# Patient Record
Sex: MALE | Race: WHITE | NOT HISPANIC OR LATINO
[De-identification: names, ages, dates, MRNs, and addresses within clinical notes are randomized per-mention and may not be internally consistent; named-entity substitution may affect disease eponyms.]

---

## 2023-05-16 ENCOUNTER — TRANSCRIPTION ENCOUNTER (OUTPATIENT)
Age: 68
End: 2023-05-16

## 2023-05-22 ENCOUNTER — TRANSCRIPTION ENCOUNTER (OUTPATIENT)
Age: 68
End: 2023-05-22

## 2023-05-22 PROBLEM — Z00.00 ENCOUNTER FOR PREVENTIVE HEALTH EXAMINATION: Status: ACTIVE | Noted: 2023-05-22

## 2023-05-24 ENCOUNTER — APPOINTMENT (OUTPATIENT)
Dept: UROLOGY | Facility: CLINIC | Age: 68
End: 2023-05-24
Payer: COMMERCIAL

## 2023-05-24 VITALS
DIASTOLIC BLOOD PRESSURE: 82 MMHG | HEIGHT: 69 IN | SYSTOLIC BLOOD PRESSURE: 124 MMHG | OXYGEN SATURATION: 95 % | WEIGHT: 210 LBS | BODY MASS INDEX: 31.1 KG/M2 | TEMPERATURE: 97.7 F | HEART RATE: 95 BPM

## 2023-05-24 PROCEDURE — 99205 OFFICE O/P NEW HI 60 MIN: CPT

## 2023-05-24 NOTE — HISTORY OF PRESENT ILLNESS
[FreeTextEntry1] : 67 year old man with history of BPH, recent hospital admission for urinary retention, urosepsis and MAGGIE.\par \par He initially presented to [facility] with urinary retention on 5/3/23. He had gilmore catheter placed at that time and was started on tamsulosin. He had subsequent unsuccessful void trial in the office. He then presented to Urologist with fevers and tachycardia, sent to ED, admitted to the hospital and started on IV antibiotics. He was found to have klebsiella bacteremia. Repeat blood cultures were negative. Eventually discharged with 14 days of levaquin. \par \par Prior to this episode, he did have urinary symptoms, though not bothersome enough to take medications. \par \par CT A/P: mild bilateral hydronephrosis, 200 cc prostate\par \par PSA 6/2022--5.4. No prior prostate biopsies or prostate MRI. No known family history of prostate cancer.

## 2023-05-24 NOTE — PHYSICAL EXAM
[General Appearance - Well Developed] : well developed [General Appearance - Well Nourished] : well nourished [Normal Appearance] : normal appearance [Well Groomed] : well groomed [General Appearance - In No Acute Distress] : no acute distress [Edema] : no peripheral edema [Respiration, Rhythm And Depth] : normal respiratory rhythm and effort [Exaggerated Use Of Accessory Muscles For Inspiration] : no accessory muscle use [Abdomen Soft] : soft [Abdomen Tenderness] : non-tender [Costovertebral Angle Tenderness] : no ~M costovertebral angle tenderness [Urethral Meatus] : meatus normal [Urinary Bladder Findings] : the bladder was normal on palpation [Scrotum] : the scrotum was normal [Testes Mass (___cm)] : there were no testicular masses [No Prostate Nodules] : no prostate nodules [Prostate Size ___ gm] : prostate size [unfilled] gm [Normal Station and Gait] : the gait and station were normal for the patient's age [] : no rash [No Focal Deficits] : no focal deficits [Oriented To Time, Place, And Person] : oriented to person, place, and time [Affect] : the affect was normal [Mood] : the mood was normal [Not Anxious] : not anxious [FreeTextEntry1] : Benito catheter in place with clear urine

## 2023-05-24 NOTE — ASSESSMENT
[FreeTextEntry1] : Assessment: Mr. BOO RHODES  is a 67 year year old man with 200 cc prostate, elevated PSA to 5.4 with no prior MRI or biopsy, recent onset of urinary retention and episode of urosepsis with klebsiella bacteremia. Given several failed void trials, recommend indwelling gilmore catheter or CIC until prostate debulking procedure can be performed.\par \par We discussed different therapeutic options including TURP, PVP, simple prostatectomy (open, laparoscopic or transurethral laser enucleation), Aquablation, Urolift therapy, and Rezum in addition to full continuation of medical therapy. Discussed the issues of incontinence, retrograde ejaculation, erectile dysfunction, irritative voiding symptoms, injury to urethra and bladder, persistence of irritative voiding symptoms, urethral stricture or bladder neck contracture, need for open surgery to repair the injury, erectile dysfunction and infertility.\par \par I made it clear that because of his large prostate size > 80 grams, AUA recommendation is to perform a simple prostatectomy due to the ability to obtain effective and durable improvement in voiding symptoms. A simple prostatectomy can be performed via an open approach, laparoscopic approach or transurethral laser enucleation approach. We discussed the risks and benefits of each approach. Overall, the long term outcomes are similar. However, the laser enucleation procedure has the least morbidity with quickest recovery of the three options. Therefore, I have recommended laser enucleation of the prostate performed via a transurethral approach.\par \par Mr. RHODES decided to proceed with laser enucleation of prostate followed by prostate morcellation. Therefore, I spent a good amount of time discussing the risks and benefits of this procedure. We discussed potential risks of incontinence, retrograde ejaculation and infertility, erectile dysfunction, irritative voiding symptoms, injury to the urethra and bladder, rare need to convert to an open surgery.\par \par  - OR for HoLEP\par  - Pre-op clearance\par  - Plan for pre-op and post-op antibiotics with levaquin\par  - Prostate MRI given elevated PSA without prior evaluation

## 2023-05-24 NOTE — LETTER BODY
[Dear  ___] : Dear  [unfilled], [Courtesy Letter:] : I had the pleasure of seeing your patient, [unfilled], in my office today. [Please see my note below.] : Please see my note below. [Consult Closing:] : Thank you very much for allowing me to participate in the care of this patient.  If you have any questions, please do not hesitate to contact me. [Sincerely,] : Sincerely, [FreeTextEntry3] : Eren Greenwood MD

## 2023-05-25 LAB
ALBUMIN SERPL ELPH-MCNC: 4.4 G/DL
ALP BLD-CCNC: 116 U/L
ALT SERPL-CCNC: 19 U/L
ANION GAP SERPL CALC-SCNC: 16 MMOL/L
APTT BLD: 30.8 SEC
AST SERPL-CCNC: 17 U/L
BILIRUB SERPL-MCNC: 0.5 MG/DL
BUN SERPL-MCNC: 18 MG/DL
CALCIUM SERPL-MCNC: 9.9 MG/DL
CHLORIDE SERPL-SCNC: 105 MMOL/L
CO2 SERPL-SCNC: 20 MMOL/L
CREAT SERPL-MCNC: 1.72 MG/DL
EGFR: 44 ML/MIN/1.73M2
GLUCOSE SERPL-MCNC: 96 MG/DL
INR PPP: 1.17 RATIO
POTASSIUM SERPL-SCNC: 4 MMOL/L
PROT SERPL-MCNC: 7.2 G/DL
PT BLD: 13.6 SEC
SODIUM SERPL-SCNC: 141 MMOL/L

## 2023-05-26 ENCOUNTER — NON-APPOINTMENT (OUTPATIENT)
Age: 68
End: 2023-05-26

## 2023-05-26 DIAGNOSIS — N32.89 OTHER SPECIFIED DISORDERS OF BLADDER: ICD-10-CM

## 2023-05-26 LAB — BACTERIA UR CULT: NORMAL

## 2023-06-02 ENCOUNTER — NON-APPOINTMENT (OUTPATIENT)
Age: 68
End: 2023-06-02

## 2023-06-02 ENCOUNTER — APPOINTMENT (OUTPATIENT)
Dept: INTERNAL MEDICINE | Facility: CLINIC | Age: 68
End: 2023-06-02
Payer: COMMERCIAL

## 2023-06-02 VITALS
BODY MASS INDEX: 30.07 KG/M2 | WEIGHT: 203 LBS | SYSTOLIC BLOOD PRESSURE: 117 MMHG | HEIGHT: 69 IN | TEMPERATURE: 97 F | DIASTOLIC BLOOD PRESSURE: 83 MMHG | OXYGEN SATURATION: 96 % | HEART RATE: 87 BPM

## 2023-06-02 DIAGNOSIS — N18.30 CHRONIC KIDNEY DISEASE, STAGE 3 UNSPECIFIED: ICD-10-CM

## 2023-06-02 DIAGNOSIS — Z78.9 OTHER SPECIFIED HEALTH STATUS: ICD-10-CM

## 2023-06-02 DIAGNOSIS — Z01.818 ENCOUNTER FOR OTHER PREPROCEDURAL EXAMINATION: ICD-10-CM

## 2023-06-02 DIAGNOSIS — Z80.9 FAMILY HISTORY OF MALIGNANT NEOPLASM, UNSPECIFIED: ICD-10-CM

## 2023-06-02 DIAGNOSIS — U07.1 COVID-19: ICD-10-CM

## 2023-06-02 DIAGNOSIS — Z87.01 PERSONAL HISTORY OF PNEUMONIA (RECURRENT): ICD-10-CM

## 2023-06-02 DIAGNOSIS — Z80.0 FAMILY HISTORY OF MALIGNANT NEOPLASM OF DIGESTIVE ORGANS: ICD-10-CM

## 2023-06-02 PROCEDURE — 36415 COLL VENOUS BLD VENIPUNCTURE: CPT

## 2023-06-02 PROCEDURE — 99203 OFFICE O/P NEW LOW 30 MIN: CPT | Mod: 25

## 2023-06-02 RX ORDER — OXYBUTYNIN CHLORIDE 5 MG/1
5 TABLET ORAL
Qty: 10 | Refills: 0 | Status: DISCONTINUED | COMMUNITY
Start: 2023-05-26 | End: 2023-06-02

## 2023-06-02 RX ORDER — TAMSULOSIN HCL 0.4 MG
0.4 CAPSULE ORAL
Refills: 0 | Status: DISCONTINUED | COMMUNITY
End: 2023-06-02

## 2023-06-02 RX ORDER — LEVOFLOXACIN 750 MG/1
750 TABLET, FILM COATED ORAL
Refills: 0 | Status: DISCONTINUED | COMMUNITY
End: 2023-06-02

## 2023-06-02 NOTE — HISTORY OF PRESENT ILLNESS
[No Pertinent Cardiac History] : no history of aortic stenosis, atrial fibrillation, coronary artery disease, recent myocardial infarction, or implantable device/pacemaker [No Pertinent Pulmonary History] : no history of asthma, COPD, sleep apnea, or smoking [No Adverse Anesthesia Reaction] : no adverse anesthesia reaction in self or family member [Chronic Kidney Disease] : chronic kidney disease [(Patient denies any chest pain, claudication, dyspnea on exertion, orthopnea, palpitations or syncope)] : Patient denies any chest pain, claudication, dyspnea on exertion, orthopnea, palpitations or syncope [Moderate (4-6 METs)] : Moderate (4-6 METs) [Chronic Anticoagulation] : no chronic anticoagulation [Diabetes] : no diabetes [FreeTextEntry1] : Laser enucleation of the prostate [FreeTextEntry2] : 6/8/23 [FreeTextEntry3] : Dr. Greenwood [FreeTextEntry4] : Mr. BOO RHODES is a 68 year old male with history of BPH c/b urinary retention presenting today to the [facility] center for evaluation prior to prostate ablation. He recently presented to the ED with with fever, tachycardia and leukocytosis and was admitted for catheter associated UTI complicated by Klebsiella bacteremia. He was treated with IV ceftriaxone and repeat cultures were negative. He was discharged on oral Levaquin with Benito catheter in place. He denies any other medical problems and takes no medications, but labs in the ED/hospital suggestive of CKD. \par  [FreeTextEntry8] : walking a lot, currently limited by Benito catheter

## 2023-06-02 NOTE — ASSESSMENT
[High Risk Surgery - Intraperitoneal, Intrathoracic or Supringuinal Vascular Procedures] : High Risk Surgery - Intraperitoneal, Intrathoracic or Supringuinal Vascular Procedures - No (0) [Ischemic Heart Disease] : Ischemic Heart Disease - No (0) [Congestive Heart Failure] : Congestive Heart Failure - No (0) [Prior Cerebrovascular Accident or TIA] : Prior Cerebrovascular Accident or TIA - No (0) [Creatinine >= 2mg/dL (1 Point)] : Creatinine >= 2mg/dL - No (0) [Insulin-dependent Diabetic (1 Point)] : Insulin-dependent Diabetic - No (0) [0] : 0 , RCRI Class: I, Risk of Post-Op Cardiac Complications: 3.9%, 95% CI for Risk Estimate: 2.8% - 5.4% [Patient Optimized for Surgery] : Patient optimized for surgery [FreeTextEntry4] : Mr. BOO RHODES is a 68 year old male with history of CKD and BPH c/b urinary retention presenting today to the [facility] Pre-Op center for evaluation prior to prostate surgery. Labs, CXR, and ECG reviewed. Discussed renal healthy diet. No contraindication to surgery. He is considered low risk for low risk procedure.

## 2023-06-05 ENCOUNTER — RESULT REVIEW (OUTPATIENT)
Age: 68
End: 2023-06-05

## 2023-06-05 ENCOUNTER — OUTPATIENT (OUTPATIENT)
Dept: OUTPATIENT SERVICES | Facility: HOSPITAL | Age: 68
LOS: 1 days | End: 2023-06-05
Payer: COMMERCIAL

## 2023-06-05 ENCOUNTER — APPOINTMENT (OUTPATIENT)
Dept: MRI IMAGING | Facility: HOSPITAL | Age: 68
End: 2023-06-05

## 2023-06-05 LAB — POCT ISTAT CREATININE: 1.8 MG/DL — HIGH (ref 0.5–1.3)

## 2023-06-05 PROCEDURE — 72197 MRI PELVIS W/O & W/DYE: CPT | Mod: 26

## 2023-06-05 PROCEDURE — 72197 MRI PELVIS W/O & W/DYE: CPT

## 2023-06-05 PROCEDURE — A9585: CPT

## 2023-06-05 PROCEDURE — 82565 ASSAY OF CREATININE: CPT

## 2023-06-07 ENCOUNTER — NON-APPOINTMENT (OUTPATIENT)
Age: 68
End: 2023-06-07

## 2023-06-07 ENCOUNTER — TRANSCRIPTION ENCOUNTER (OUTPATIENT)
Age: 68
End: 2023-06-07

## 2023-06-08 ENCOUNTER — RESULT REVIEW (OUTPATIENT)
Age: 68
End: 2023-06-08

## 2023-06-08 ENCOUNTER — OUTPATIENT (OUTPATIENT)
Dept: OUTPATIENT SERVICES | Facility: HOSPITAL | Age: 68
LOS: 1 days | Discharge: HOME | End: 2023-06-08
Payer: COMMERCIAL

## 2023-06-08 ENCOUNTER — TRANSCRIPTION ENCOUNTER (OUTPATIENT)
Age: 68
End: 2023-06-08

## 2023-06-08 ENCOUNTER — APPOINTMENT (OUTPATIENT)
Dept: UROLOGY | Facility: HOSPITAL | Age: 68
End: 2023-06-08

## 2023-06-08 VITALS
WEIGHT: 205.03 LBS | RESPIRATION RATE: 16 BRPM | TEMPERATURE: 97 F | HEART RATE: 89 BPM | HEIGHT: 69 IN | SYSTOLIC BLOOD PRESSURE: 136 MMHG | DIASTOLIC BLOOD PRESSURE: 89 MMHG | OXYGEN SATURATION: 97 %

## 2023-06-08 VITALS
SYSTOLIC BLOOD PRESSURE: 135 MMHG | OXYGEN SATURATION: 95 % | HEART RATE: 68 BPM | DIASTOLIC BLOOD PRESSURE: 80 MMHG | RESPIRATION RATE: 16 BRPM

## 2023-06-08 DIAGNOSIS — Z90.89 ACQUIRED ABSENCE OF OTHER ORGANS: Chronic | ICD-10-CM

## 2023-06-08 DIAGNOSIS — Z98.890 OTHER SPECIFIED POSTPROCEDURAL STATES: Chronic | ICD-10-CM

## 2023-06-08 DIAGNOSIS — Z98.49 CATARACT EXTRACTION STATUS, UNSPECIFIED EYE: Chronic | ICD-10-CM

## 2023-06-08 PROBLEM — U07.1 COVID-19: Chronic | Status: ACTIVE | Noted: 2023-06-07

## 2023-06-08 PROBLEM — Z87.438 PERSONAL HISTORY OF OTHER DISEASES OF MALE GENITAL ORGANS: Chronic | Status: ACTIVE | Noted: 2023-06-07

## 2023-06-08 PROCEDURE — 88344 IMHCHEM/IMCYTCHM EA MLT ANTB: CPT | Mod: 26

## 2023-06-08 PROCEDURE — 76872 US TRANSRECTAL: CPT | Mod: 26

## 2023-06-08 PROCEDURE — 76377 3D RENDER W/INTRP POSTPROCES: CPT | Mod: 26

## 2023-06-08 PROCEDURE — 55700: CPT

## 2023-06-08 PROCEDURE — G0416: CPT | Mod: 26

## 2023-06-08 RX ORDER — SODIUM CHLORIDE 9 MG/ML
1000 INJECTION, SOLUTION INTRAVENOUS
Refills: 0 | Status: DISCONTINUED | OUTPATIENT
Start: 2023-06-08 | End: 2023-06-08

## 2023-06-08 RX ORDER — FENTANYL CITRATE 50 UG/ML
25 INJECTION INTRAVENOUS
Refills: 0 | Status: DISCONTINUED | OUTPATIENT
Start: 2023-06-08 | End: 2023-06-08

## 2023-06-08 RX ORDER — KETOROLAC TROMETHAMINE 30 MG/ML
30 SYRINGE (ML) INJECTION ONCE
Refills: 0 | Status: DISCONTINUED | OUTPATIENT
Start: 2023-06-08 | End: 2023-06-08

## 2023-06-08 RX ORDER — LIDOCAINE HCL 20 MG/ML
1 VIAL (ML) INJECTION ONCE
Refills: 0 | Status: COMPLETED | OUTPATIENT
Start: 2023-06-08 | End: 2023-06-08

## 2023-06-08 RX ADMIN — Medication 30 MILLIGRAM(S): at 18:30

## 2023-06-08 RX ADMIN — Medication 30 MILLIGRAM(S): at 18:11

## 2023-06-08 RX ADMIN — SODIUM CHLORIDE 100 MILLILITER(S): 9 INJECTION, SOLUTION INTRAVENOUS at 18:19

## 2023-06-08 RX ADMIN — Medication 1 MILLILITER(S): at 19:45

## 2023-06-08 NOTE — BRIEF OPERATIVE NOTE - NSICDXBRIEFPROCEDURE_GEN_ALL_CORE_FT
PROCEDURES:  Biopsy of prostate using magnetic resonance imaging-ultrasound fusion guidance 08-Jun-2023 16:42:35  Jocelyn Bobo

## 2023-06-08 NOTE — ASU PREOP CHECKLIST - 1.
Return to er with fever, chills, legs swelling, chest pain, shortness of breath or return of symptoms  See pcp for follow up  Pt states no signs or symptoms of Covid for the past 10 days

## 2023-06-08 NOTE — ASU DISCHARGE PLAN (ADULT/PEDIATRIC) - CARE PROVIDER_API CALL
Eren Greenwood  Urology  [address] [place] [id number]  Phone: [phone]  Fax: [phone]  Follow Up Time: 2 weeks

## 2023-06-08 NOTE — PACU DISCHARGE NOTE - HYDRATION STATUS:
Dr. Thurston at bedside to speak with pt and spouse about procedure and results. All questions answered with no further questions at this time.   
Satisfactory

## 2023-06-08 NOTE — ASU DISCHARGE PLAN (ADULT/PEDIATRIC) - ASU DC SPECIAL INSTRUCTIONSFT
Expect Blood in stool and urine for the next few days. Change dressing and keep covered as necessary with gauze and bacitracin. For pain take tylenol as directed on bottle every 4-6 hours for pain. Ice to area as needed.**see post op instruction sheet for detailed instructions**    follow up in 2 weeks

## 2023-06-08 NOTE — ASU DISCHARGE PLAN (ADULT/PEDIATRIC) - NS MD DC FALL RISK RISK
For information on Fall & Injury Prevention, visit: [email or web address] OR  [email or web address] OR  https://www.cdc.gov/steadi/patient.html

## 2023-06-09 ENCOUNTER — NON-APPOINTMENT (OUTPATIENT)
Age: 68
End: 2023-06-09

## 2023-06-19 RX ORDER — LEVOFLOXACIN 250 MG/1
250 TABLET, FILM COATED ORAL DAILY
Qty: 5 | Refills: 0 | Status: ACTIVE | COMMUNITY
Start: 2023-06-19 | End: 1900-01-01

## 2023-06-20 ENCOUNTER — APPOINTMENT (OUTPATIENT)
Dept: UROLOGY | Facility: CLINIC | Age: 68
End: 2023-06-20
Payer: COMMERCIAL

## 2023-06-20 LAB — SURGICAL PATHOLOGY STUDY: SIGNIFICANT CHANGE UP

## 2023-06-20 PROCEDURE — 99214 OFFICE O/P EST MOD 30 MIN: CPT

## 2023-06-20 NOTE — ASSESSMENT
[FreeTextEntry1] : Assessment: Mr. BOO RHODES is a 67 year year old man with 258 cc prostate, elevated PSA to 5.4 with PIRADS 5 lesions on MRI, negative fusion biopsy with only 1 core GG 1 cancer on systematic biopsy. \par \par We reviewed the natural history of prostate cancer and the low-risk nature of his disease. As such, all potential treatment options ranging from active surveillance, radiation therapy or radical prostatectomy are available to him. The details of each, including all risks, benefits and impact on urinary and sexual function, were discussed in great deal with the patient. After a lengthy discussion the patient expressed his desire to proceed with active surveillance. Active surveillance would require a PSA and ALONDRA at every 3-6 month follow-up appointment for this first year. This may be followed by a repeat biopsy in 12-18 months or prostate MRI. We discussed that approximately 1/3 of men on active surveillance will require intervention for prostate cancer over a ten year period and the small possibility that his disease may progress beyond local curative treatment.\par \par For management of his urinary retention, Mr. RHODES decided to proceed with laser enucleation of prostate followed by prostate morcellation. Therefore, I spent a good amount of time discussing the risks and benefits of this procedure. We discussed potential risks of incontinence, retrograde ejaculation and infertility, erectile dysfunction, irritative voiding symptoms, injury to the urethra and bladder, rare need to convert to an open surgery.\par \par  - OR for HoLEP\par  - Pre-op clearance\par  - Plan for pre-op and post-op antibiotics with levaquin \par \par

## 2023-06-20 NOTE — PHYSICAL EXAM
No deformity or limitation of movement [General Appearance - Well Developed] : well developed [General Appearance - Well Nourished] : well nourished [Normal Appearance] : normal appearance [Well Groomed] : well groomed [General Appearance - In No Acute Distress] : no acute distress [Abdomen Soft] : soft [Abdomen Tenderness] : non-tender [Costovertebral Angle Tenderness] : no ~M costovertebral angle tenderness [Urethral Meatus] : meatus normal [Urinary Bladder Findings] : the bladder was normal on palpation [Scrotum] : the scrotum was normal [Testes Mass (___cm)] : there were no testicular masses [No Prostate Nodules] : no prostate nodules [Prostate Size ___ gm] : prostate size [unfilled] gm [Edema] : no peripheral edema [] : no respiratory distress [Respiration, Rhythm And Depth] : normal respiratory rhythm and effort [Exaggerated Use Of Accessory Muscles For Inspiration] : no accessory muscle use [Oriented To Time, Place, And Person] : oriented to person, place, and time [Affect] : the affect was normal [Mood] : the mood was normal [Not Anxious] : not anxious [Normal Station and Gait] : the gait and station were normal for the patient's age [No Focal Deficits] : no focal deficits [FreeTextEntry1] : Benito catheter in place with clear urine detailed exam

## 2023-06-20 NOTE — HISTORY OF PRESENT ILLNESS
[FreeTextEntry1] : 67 year old man with history of BPH, recent hospital admission for urinary retention, urosepsis and MAGGIE.\par \par He initially presented to [facility] with urinary retention on 5/3/23. He had gilmore catheter placed at that time and was started on tamsulosin. He had subsequent unsuccessful void trial in the office. He then presented to Urologist with fevers and tachycardia, sent to ED, admitted to the hospital and started on IV antibiotics. He was found to have klebsiella bacteremia. Repeat blood cultures were negative. Eventually discharged with 14 days of levaquin. \par \par Prior to this episode, he did have urinary symptoms, though not bothersome enough to take medications. \par \par CT A/P: mild bilateral hydronephrosis, 200 cc prostate\par \par PSA 6/2022--5.4. No prior prostate biopsies or prostate MRI. No known family history of prostate cancer. \par \par Prostate biopsy 6/8/23: Negative fusion biopsy, 1/12 systematic cores with GG 1 prostate cancer\par \par 6/20/23: presents to discuss prostate biopsy result and manage catheter. Catheter draining poorly, positional. Repositioned in office with improved drainage. No hematuria or fevers.

## 2023-06-26 RX ORDER — LEVOFLOXACIN 250 MG/1
250 TABLET, FILM COATED ORAL
Qty: 7 | Refills: 0 | Status: ACTIVE | COMMUNITY
Start: 2023-06-06 | End: 1900-01-01

## 2023-06-28 ENCOUNTER — NON-APPOINTMENT (OUTPATIENT)
Age: 68
End: 2023-06-28

## 2023-06-29 ENCOUNTER — TRANSCRIPTION ENCOUNTER (OUTPATIENT)
Age: 68
End: 2023-06-29

## 2023-06-29 VITALS
SYSTOLIC BLOOD PRESSURE: 130 MMHG | HEART RATE: 91 BPM | TEMPERATURE: 98 F | WEIGHT: 205.25 LBS | RESPIRATION RATE: 18 BRPM | DIASTOLIC BLOOD PRESSURE: 90 MMHG | HEIGHT: 69 IN | OXYGEN SATURATION: 97 %

## 2023-06-29 NOTE — ASU PATIENT PROFILE, ADULT - NSICDXPASTMEDICALHX_GEN_ALL_CORE_FT
PAST MEDICAL HISTORY:  History of BPH c/b urinary retention    Pneumonia due to COVID-19 virus 1/21     PAST MEDICAL HISTORY:  History of BPH c/b urinary retention    HLD (hyperlipidemia)     Pneumonia due to COVID-19 virus 1/21

## 2023-06-30 ENCOUNTER — TRANSCRIPTION ENCOUNTER (OUTPATIENT)
Age: 68
End: 2023-06-30

## 2023-06-30 ENCOUNTER — APPOINTMENT (OUTPATIENT)
Dept: UROLOGY | Facility: HOSPITAL | Age: 68
End: 2023-06-30

## 2023-06-30 ENCOUNTER — INPATIENT (INPATIENT)
Facility: HOSPITAL | Age: 68
LOS: 0 days | Discharge: HOME | DRG: 713 | End: 2023-07-01
Attending: SURGERY | Admitting: SURGERY
Payer: COMMERCIAL

## 2023-06-30 DIAGNOSIS — Z90.89 ACQUIRED ABSENCE OF OTHER ORGANS: Chronic | ICD-10-CM

## 2023-06-30 DIAGNOSIS — Z87.438 PERSONAL HISTORY OF OTHER DISEASES OF MALE GENITAL ORGANS: ICD-10-CM

## 2023-06-30 PROCEDURE — 88305 TISSUE EXAM BY PATHOLOGIST: CPT | Mod: 26

## 2023-06-30 PROCEDURE — 52649 PROSTATE LASER ENUCLEATION: CPT | Mod: 22

## 2023-06-30 DEVICE — MOCELLATOR ROTATION SINGLEUSE 4.75: Type: IMPLANTABLE DEVICE | Status: FUNCTIONAL

## 2023-06-30 DEVICE — LASER FIBER SOLTIVE 550: Type: IMPLANTABLE DEVICE | Status: FUNCTIONAL

## 2023-06-30 RX ORDER — BACILLUS COAGULANS/INULIN 1B-250 MG
1 CAPSULE ORAL
Refills: 0 | DISCHARGE

## 2023-06-30 RX ORDER — SODIUM CHLORIDE 9 MG/ML
1000 INJECTION, SOLUTION INTRAVENOUS
Refills: 0 | Status: DISCONTINUED | OUTPATIENT
Start: 2023-06-30 | End: 2023-07-01

## 2023-06-30 RX ORDER — ACETAMINOPHEN 500 MG
650 TABLET ORAL EVERY 6 HOURS
Refills: 0 | Status: DISCONTINUED | OUTPATIENT
Start: 2023-06-30 | End: 2023-07-01

## 2023-06-30 RX ORDER — ONDANSETRON 8 MG/1
4 TABLET, FILM COATED ORAL EVERY 6 HOURS
Refills: 0 | Status: DISCONTINUED | OUTPATIENT
Start: 2023-06-30 | End: 2023-07-01

## 2023-06-30 RX ADMIN — SODIUM CHLORIDE 100 MILLILITER(S): 9 INJECTION, SOLUTION INTRAVENOUS at 18:59

## 2023-06-30 NOTE — H&P ADULT - HISTORY OF PRESENT ILLNESS
HPI: 67 year old male with 258 cc prostate, elevated PSA to 5.4 with PIRADS 5 lesions on MRI, negative fusion biopsy with only 1 core GG 1 cancer on systematic biopsy. He presents today for laser enucleation of prostate.         PAST MEDICAL & SURGICAL HISTORY:  History of BPH  c/b urinary retention      Pneumonia due to COVID-19 virus  1/21      HLD (hyperlipidemia)      S/P tonsillectomy          MEDICATIONS  (STANDING):  lactated ringers. 1000 milliLiter(s) (100 mL/Hr) IV Continuous <Continuous>    MEDICATIONS  (PRN):  acetaminophen     Tablet .. 650 milliGRAM(s) Oral every 6 hours PRN Mild Pain (1 - 3), Moderate Pain (4 - 6)  ondansetron Injectable 4 milliGRAM(s) IV Push every 6 hours PRN Nausea      Allergies    No Known Allergies    Intolerances        SOCIAL HISTORY:    FAMILY HISTORY:      Vital Signs Last 24 Hrs  T(C): 36.6 (30 Jun 2023 11:56), Max: 36.6 (30 Jun 2023 11:56)  T(F): --  HR: 91 (30 Jun 2023 11:56) (91 - 91)  BP: 130/90 (30 Jun 2023 11:56) (130/90 - 130/90)  BP(mean): --  RR: 18 (30 Jun 2023 11:56) (18 - 18)  SpO2: 97% (30 Jun 2023 11:56) (97% - 97%)        PHYSICAL EXAM  General: Intubated on OR table  : 22fr 3-way gilmore on CBI draining clear  Extrem: WWP, no edema, SCDs in place      LABS:                RADIOLOGY & ADDITIONAL STUDIES:

## 2023-06-30 NOTE — BRIEF OPERATIVE NOTE - NSICDXBRIEFPROCEDURE_GEN_ALL_CORE_FT
PROCEDURES:  Laser enucleation of prostate with intravesical morcellation 30-Jun-2023 17:28:56  Jarret Haynes

## 2023-06-30 NOTE — BRIEF OPERATIVE NOTE - OPERATION/FINDINGS
Laser enucleation of prostate. 22fr 3-way gilmore on CBI. Please see operative dictation for full details of the case.

## 2023-06-30 NOTE — H&P ADULT - ASSESSMENT
67 year old male with 258 cc prostate, elevated PSA to 5.4 with PIRADS 5 lesions on MRI, negative fusion biopsy with only 1 core GG 1 cancer on systematic biopsy. He presents today for laser enucleation of prostate.     Plan:  -OR for HoLEP

## 2023-06-30 NOTE — BRIEF OPERATIVE NOTE - NSICDXBRIEFPOSTOP_GEN_ALL_CORE_FT
[facility] Call Center    Phone Message  INR results - 3.1 - with no other changes     May a detailed message be left on voicemail: yes    Reason for Call: Other: INR results - 3.1 - with no other changes      Action Taken: Message routed to:  Primary Care p [phone]     POST-OP DIAGNOSIS:  BPH (benign prostatic hyperplasia) 30-Jun-2023 17:29:09  Jarret Haynes

## 2023-06-30 NOTE — H&P ADULT - NSICDXPASTMEDICALHX_GEN_ALL_CORE_FT
PAST MEDICAL HISTORY:  History of BPH c/b urinary retention    HLD (hyperlipidemia)     Pneumonia due to COVID-19 virus 1/21

## 2023-07-01 ENCOUNTER — TRANSCRIPTION ENCOUNTER (OUTPATIENT)
Age: 68
End: 2023-07-01

## 2023-07-01 VITALS
OXYGEN SATURATION: 97 % | DIASTOLIC BLOOD PRESSURE: 64 MMHG | SYSTOLIC BLOOD PRESSURE: 110 MMHG | RESPIRATION RATE: 17 BRPM | TEMPERATURE: 98 F | HEART RATE: 66 BPM

## 2023-07-01 LAB
ANION GAP SERPL CALC-SCNC: 10 MMOL/L — SIGNIFICANT CHANGE UP (ref 5–17)
BASOPHILS # BLD AUTO: 0.01 K/UL — SIGNIFICANT CHANGE UP (ref 0–0.2)
BASOPHILS NFR BLD AUTO: 0.1 % — SIGNIFICANT CHANGE UP (ref 0–2)
BUN SERPL-MCNC: 16 MG/DL — SIGNIFICANT CHANGE UP (ref 7–23)
CALCIUM SERPL-MCNC: 8.3 MG/DL — LOW (ref 8.4–10.5)
CHLORIDE SERPL-SCNC: 105 MMOL/L — SIGNIFICANT CHANGE UP (ref 96–108)
CO2 SERPL-SCNC: 25 MMOL/L — SIGNIFICANT CHANGE UP (ref 22–31)
CREAT SERPL-MCNC: 1.65 MG/DL — HIGH (ref 0.5–1.3)
EGFR: 46 ML/MIN/1.73M2 — LOW
EOSINOPHIL # BLD AUTO: 0 K/UL — SIGNIFICANT CHANGE UP (ref 0–0.5)
EOSINOPHIL NFR BLD AUTO: 0 % — SIGNIFICANT CHANGE UP (ref 0–6)
GLUCOSE SERPL-MCNC: 138 MG/DL — HIGH (ref 70–99)
HCT VFR BLD CALC: 38.9 % — LOW (ref 39–50)
HCV AB S/CO SERPL IA: 0.04 S/CO — SIGNIFICANT CHANGE UP
HCV AB SERPL-IMP: SIGNIFICANT CHANGE UP
HGB BLD-MCNC: 12.9 G/DL — LOW (ref 13–17)
IMM GRANULOCYTES NFR BLD AUTO: 0.3 % — SIGNIFICANT CHANGE UP (ref 0–0.9)
LYMPHOCYTES # BLD AUTO: 0.86 K/UL — LOW (ref 1–3.3)
LYMPHOCYTES # BLD AUTO: 8.1 % — LOW (ref 13–44)
MAGNESIUM SERPL-MCNC: 1.8 MG/DL — SIGNIFICANT CHANGE UP (ref 1.6–2.6)
MCHC RBC-ENTMCNC: 30.1 PG — SIGNIFICANT CHANGE UP (ref 27–34)
MCHC RBC-ENTMCNC: 33.2 GM/DL — SIGNIFICANT CHANGE UP (ref 32–36)
MCV RBC AUTO: 90.7 FL — SIGNIFICANT CHANGE UP (ref 80–100)
MONOCYTES # BLD AUTO: 0.65 K/UL — SIGNIFICANT CHANGE UP (ref 0–0.9)
MONOCYTES NFR BLD AUTO: 6.1 % — SIGNIFICANT CHANGE UP (ref 2–14)
NEUTROPHILS # BLD AUTO: 9.05 K/UL — HIGH (ref 1.8–7.4)
NEUTROPHILS NFR BLD AUTO: 85.4 % — HIGH (ref 43–77)
NRBC # BLD: 0 /100 WBCS — SIGNIFICANT CHANGE UP (ref 0–0)
PHOSPHATE SERPL-MCNC: 4 MG/DL — SIGNIFICANT CHANGE UP (ref 2.5–4.5)
PLATELET # BLD AUTO: 208 K/UL — SIGNIFICANT CHANGE UP (ref 150–400)
POTASSIUM SERPL-MCNC: 4.3 MMOL/L — SIGNIFICANT CHANGE UP (ref 3.5–5.3)
POTASSIUM SERPL-SCNC: 4.3 MMOL/L — SIGNIFICANT CHANGE UP (ref 3.5–5.3)
RBC # BLD: 4.29 M/UL — SIGNIFICANT CHANGE UP (ref 4.2–5.8)
RBC # FLD: 13.2 % — SIGNIFICANT CHANGE UP (ref 10.3–14.5)
SODIUM SERPL-SCNC: 140 MMOL/L — SIGNIFICANT CHANGE UP (ref 135–145)
WBC # BLD: 10.6 K/UL — HIGH (ref 3.8–10.5)
WBC # FLD AUTO: 10.6 K/UL — HIGH (ref 3.8–10.5)

## 2023-07-01 PROCEDURE — C1782: CPT

## 2023-07-01 PROCEDURE — C1889: CPT

## 2023-07-01 PROCEDURE — 84100 ASSAY OF PHOSPHORUS: CPT

## 2023-07-01 PROCEDURE — 88305 TISSUE EXAM BY PATHOLOGIST: CPT

## 2023-07-01 PROCEDURE — 86803 HEPATITIS C AB TEST: CPT

## 2023-07-01 PROCEDURE — 85025 COMPLETE CBC W/AUTO DIFF WBC: CPT

## 2023-07-01 PROCEDURE — 80048 BASIC METABOLIC PNL TOTAL CA: CPT

## 2023-07-01 PROCEDURE — 83735 ASSAY OF MAGNESIUM: CPT

## 2023-07-01 PROCEDURE — 36415 COLL VENOUS BLD VENIPUNCTURE: CPT

## 2023-07-01 PROCEDURE — 87086 URINE CULTURE/COLONY COUNT: CPT

## 2023-07-01 NOTE — DISCHARGE NOTE PROVIDER - HOSPITAL COURSE
This is a 67 year old male with 258 cc prostate, elevated PSA to 5.4 with PIRADS 5 lesions on MRI, negative fusion biopsy with only 1 core GG 1 cancer on systematic biopsy. He admitted for an uncomplicated laser enucleation of prostate. His Benito catheter has been removed and he is now ready for discharge.

## 2023-07-01 NOTE — DISCHARGE NOTE NURSING/CASE MANAGEMENT/SOCIAL WORK - NSDCPEFALRISK_GEN_ALL_CORE
For information on Fall & Injury Prevention, visit: [email or web address] OR  [email or web address] OR  https://www.cdc.gov/steadi/patient.html

## 2023-07-01 NOTE — DISCHARGE NOTE PROVIDER - NSDCMRMEDTOKEN_GEN_ALL_CORE_FT
Levaquin 500 mg oral tablet: 1 orally 2 times a day prescribed for preop/catheter  Probiotic Formula (Bacillus Coagulans) oral capsule: 1 orally

## 2023-07-01 NOTE — DISCHARGE NOTE PROVIDER - NSDCFUADDINST_GEN_ALL_CORE_FT
Please follow up with Dr. Greenwood in 1 week.  Call the office to schedule your appointment.  No tub bathing or swimming until confirmation during your follow up appointment.  Ambulate as tolerated, but no heavy lifting (>10lbs) or strenuous exercise.  You may resume regular diet.  You should be urinating at least 3-4x per day.  Call the office if you experience increasing pain, abdominal pain, nausea, vomiting, or temperature >101.4F.   Please follow up with Dr. Greenwood in 1 week.  Call the office to schedule your appointment.  No tub bathing or swimming until confirmation during your follow up appointment.  Ambulate as tolerated, but no heavy lifting (>10lbs) or strenuous exercise.  You may resume regular diet and drink extra fluids.  You should be urinating at least 3-4x per day.  Call the office if you experience increasing pain, abdominal pain, nausea, vomiting, or temperature >101.4F.

## 2023-07-01 NOTE — PROGRESS NOTE ADULT - PROBLEM SELECTOR PLAN 1
- s/p enucleation of prostate   - continue gilmore with CBI   - diet: regular   - OOB/IS   - SCDs   - abx: Levaquin
- s/p enucleation of prostate   - continue gilmore with CBI   - diet: regular   - OOB/IS   - SCDs   - abx: Levaquin

## 2023-07-01 NOTE — PATIENT PROFILE ADULT - FUNCTIONAL ASSESSMENT - DAILY ACTIVITY SECTION LABEL
[facility]onn long term [facility]  1961  ______________________________________________________________________    Radiographic Studies:    Cervical MRI/ Contrast        Thoracic MRI/ Contrast        Lumbar MRI/ Contrast    CT Myelogram __________________________________________________    NCS/EMG______________________________________________________    MRI of ________________________________________________________    Other__________________________________________________________      Injections:    _______________________________________________________________    Authorization to stop blood thinners________________________________      Medications:    Oral steroids___________________    Muscle Relaxers___________________    Pain medications_____________________    NSAIDS_____________________    Neuropathic pain medication_________________________________________      Physical Therapy:    Lumbar     Thoracic     Cervical       Other_______________________________      Follow up/ Referral:    Pain referral_______________________________________________________    Referral___________________________________________________________    Follow up_________________________________________________________    Handicapped Parking_______________________________________________    Other_____________________________________________________________ .

## 2023-07-01 NOTE — PROGRESS NOTE ADULT - SUBJECTIVE AND OBJECTIVE BOX
AM Note    No acute events overnight.      Vital Signs Last 24 Hrs  T(C): 36.5 (07-01-23 @ 05:17), Max: 36.8 (06-30-23 @ 23:53)  T(F): 97.7 (07-01-23 @ 05:17), Max: 98.2 (06-30-23 @ 23:53)  HR: 66 (07-01-23 @ 05:17) (61 - 91)  BP: 118/75 (07-01-23 @ 05:17) (115/70 - 130/90)  BP(mean): 88 (06-30-23 @ 18:12) (88 - 94)  RR: 18 (07-01-23 @ 05:17) (11 - 25)  SpO2: 97% (07-01-23 @ 05:17) (91% - 99%)     01 Jul 2023 05:30    140    |  105    |  16     ----------------------------<  138    4.3     |  25     |  1.65     Ca    8.3        01 Jul 2023 05:30  Phos  4.0       01 Jul 2023 05:30  Mg     1.8       01 Jul 2023 05:30                            12.9   10.60 )-----------( 208      ( 01 Jul 2023 05:30 )             38.9         I&O's Summary    30 Jun 2023 07:01  -  01 Jul 2023 06:36  --------------------------------------------------------  IN: 42911 mL / OUT: 30861 mL / NET: -2600 mL          PHYSICAL EXAM:      GEN: NAD  ABD: Soft, ND, NT  : 22fr 3 way in place with CBI running, draining clear 
   POST OP NOTE:  procedure: enucleation of prostate   Patient denies any acute complaints. Denies chest pain, SOB, fevers, or chills. Tolerating sips of water.      06-30-23 @ 07:01  -  06-30-23 @ 18:27  --------------------------------------------------------  IN: 6100 mL / OUT: 4500 mL / NET: 1600 mL      T(C): 36.4 (06-30-23 @ 16:57), Max: 36.6 (06-30-23 @ 11:56)  HR: 73 (06-30-23 @ 18:12) (64 - 91)  BP: 117/70 (06-30-23 @ 18:12) (117/70 - 130/90)  RR: 16 (06-30-23 @ 18:12) (11 - 25)  SpO2: 96% (06-30-23 @ 18:12) (91% - 97%)    GEN: NAD  ABD: Soft, ND, NT  : 22fr 3 way in place with CBI running, draining clear

## 2023-07-01 NOTE — DISCHARGE NOTE NURSING/CASE MANAGEMENT/SOCIAL WORK - PATIENT PORTAL LINK FT
You can access the FollowMyHealth Patient Portal offered by [facility] by registering at the following website: [email or web address]. By joining [employer]’s FollowMyHealth portal, you will also be able to view your health information using other applications (apps) compatible with our system.

## 2023-07-01 NOTE — DISCHARGE NOTE PROVIDER - CARE PROVIDER_API CALL
Eren Greenwood  Urology  [address] [place] [phone]  Phone: [phone]  Fax: [phone]  Follow Up Time: 1 week

## 2023-07-01 NOTE — PATIENT PROFILE ADULT - FALL HARM RISK - HARM RISK INTERVENTIONS

## 2023-07-01 NOTE — PROGRESS NOTE ADULT - ASSESSMENT
67yo male with PMH of BPH s/p enucleation of prostate 6/30. 69yo male with PMH of BPH s/p enucleation of prostate 6/30.      -Dc Benito for TOV/ 3 PVRs  -Discharge home today

## 2023-07-02 LAB
CULTURE RESULTS: SIGNIFICANT CHANGE UP
SPECIMEN SOURCE: SIGNIFICANT CHANGE UP

## 2023-07-05 DIAGNOSIS — N40.1 BENIGN PROSTATIC HYPERPLASIA WITH LOWER URINARY TRACT SYMPTOMS: ICD-10-CM

## 2023-07-05 DIAGNOSIS — E78.5 HYPERLIPIDEMIA, UNSPECIFIED: ICD-10-CM

## 2023-07-05 DIAGNOSIS — N13.8 OTHER OBSTRUCTIVE AND REFLUX UROPATHY: ICD-10-CM

## 2023-07-05 DIAGNOSIS — R33.8 OTHER RETENTION OF URINE: ICD-10-CM

## 2023-07-05 DIAGNOSIS — Z86.16 PERSONAL HISTORY OF COVID-19: ICD-10-CM

## 2023-07-07 ENCOUNTER — APPOINTMENT (OUTPATIENT)
Dept: UROLOGY | Facility: CLINIC | Age: 68
End: 2023-07-07
Payer: COMMERCIAL

## 2023-07-07 VITALS
SYSTOLIC BLOOD PRESSURE: 127 MMHG | DIASTOLIC BLOOD PRESSURE: 85 MMHG | OXYGEN SATURATION: 97 % | TEMPERATURE: 98 F | HEART RATE: 89 BPM

## 2023-07-07 DIAGNOSIS — N39.3 STRESS INCONTINENCE (FEMALE) (MALE): ICD-10-CM

## 2023-07-07 LAB
BILIRUB UR QL STRIP: NORMAL
CLARITY UR: CLEAR
COLLECTION METHOD: NORMAL
GLUCOSE UR-MCNC: NORMAL
HCG UR QL: 0.2 EU/DL
HGB UR QL STRIP.AUTO: NORMAL
KETONES UR-MCNC: NORMAL
LEUKOCYTE ESTERASE UR QL STRIP: NORMAL
NITRITE UR QL STRIP: NORMAL
PH UR STRIP: 5.5
PROT UR STRIP-MCNC: NORMAL
SP GR UR STRIP: 1.03

## 2023-07-07 PROCEDURE — 99024 POSTOP FOLLOW-UP VISIT: CPT

## 2023-07-07 NOTE — HISTORY OF PRESENT ILLNESS
[FreeTextEntry1] : 5/24/23: 67 year old man with history of BPH, recent hospital admission for urinary retention, urosepsis and MAGGIE.\par \par He initially presented to [facility] with urinary retention on 5/3/23. He had gilmore catheter placed at that time and was started on tamsulosin. He had subsequent unsuccessful void trial in the office. He then presented to Urologist with fevers and tachycardia, sent to ED, admitted to the hospital and started on IV antibiotics. He was found to have klebsiella bacteremia. Repeat blood cultures were negative. Eventually discharged with 14 days of levaquin. \par \par Prior to this episode, he did have urinary symptoms, though not bothersome enough to take medications. \par \par CT A/P: mild bilateral hydronephrosis, 200 cc prostate\par \par PSA 6/2022--5.4. No prior prostate biopsies or prostate MRI. No known family history of prostate cancer. \par \par Prostate biopsy 6/8/23: Negative fusion biopsy, 1/12 systematic cores with GG 1 prostate cancer\par \par 6/20/23: presents to discuss prostate biopsy result and manage catheter. Catheter draining poorly, positional. Repositioned in office with improved drainage. No hematuria or fevers.\par \par 7/7/23: Patient is s/p HoLEP on 6/30/23. He is urinating with strong, steady stream. Mild hematuria. He is drinking plenty of fluids. He has post-void dribbling. No incontinence during the day, he is using a pad with few drops of blood and wetness at end of day. He has been doing Kegel exercises. \par \par Denies fever or chills. He completed antibiotics. \par \par IPSS 14, QOL 3, PVR 25 cc

## 2023-07-07 NOTE — ASSESSMENT
[FreeTextEntry1] : Assessment: Mr. BOO RHODES is a 67 year year old man with 258 cc prostate, urinary retention, elevated PSA to 5.4 with PIRADS 5 lesions on MRI, negative fusion biopsy with only 1 core GG 1 cancer on systematic biopsy. S/P HoLEP 6/30/23, passed void trial POD 1 in hospital. Doing well with strong stream, complete emptying, minimal leakage, no hematuria. \par \par We reviewed the natural history of prostate cancer and the low-risk nature of his disease. As such, all potential treatment options ranging from active surveillance, radiation therapy or radical prostatectomy are available to him. The details of each, including all risks, benefits and impact on urinary and sexual function, were discussed in great deal with the patient. After a lengthy discussion the patient expressed his desire to proceed with active surveillance. Active surveillance would require a PSA and ALONDRA at every 3-6 month follow-up appointment for this first year. This may be followed by a repeat biopsy in 12-18 months or prostate MRI. We discussed that approximately 1/3 of men on active surveillance will require intervention for prostate cancer over a ten year period and the small possibility that his disease may progress beyond local curative treatment.\par \par  - RTC 3 months: UA, IPSS, PVR, PSA

## 2023-07-11 LAB — SURGICAL PATHOLOGY STUDY: SIGNIFICANT CHANGE UP

## 2023-10-06 ENCOUNTER — APPOINTMENT (OUTPATIENT)
Dept: UROLOGY | Facility: CLINIC | Age: 68
End: 2023-10-06
Payer: COMMERCIAL

## 2023-10-06 VITALS
DIASTOLIC BLOOD PRESSURE: 99 MMHG | SYSTOLIC BLOOD PRESSURE: 147 MMHG | TEMPERATURE: 97.7 F | OXYGEN SATURATION: 96 % | HEART RATE: 79 BPM

## 2023-10-06 DIAGNOSIS — R33.8 OTHER RETENTION OF URINE: ICD-10-CM

## 2023-10-06 LAB
BILIRUB UR QL STRIP: NORMAL
CLARITY UR: CLEAR
COLLECTION METHOD: NORMAL
GLUCOSE UR-MCNC: NORMAL
HCG UR QL: 0.2 EU/DL
HGB UR QL STRIP.AUTO: NORMAL
KETONES UR-MCNC: NORMAL
LEUKOCYTE ESTERASE UR QL STRIP: NORMAL
NITRITE UR QL STRIP: NORMAL
PH UR STRIP: 5
PROT UR STRIP-MCNC: 300
SP GR UR STRIP: 1.03

## 2023-10-06 PROCEDURE — 81003 URINALYSIS AUTO W/O SCOPE: CPT | Mod: NC,QW

## 2023-10-06 PROCEDURE — 99214 OFFICE O/P EST MOD 30 MIN: CPT | Mod: 25

## 2023-10-06 PROCEDURE — 51798 US URINE CAPACITY MEASURE: CPT

## 2023-10-09 LAB
APPEARANCE: ABNORMAL
BACTERIA UR CULT: NORMAL
BACTERIA: ABNORMAL /HPF
BILIRUBIN URINE: NEGATIVE
BLOOD URINE: ABNORMAL
CALCIUM OXALATE CRYSTALS: PRESENT
CAST: 2 /LPF
COLOR: NORMAL
EPITHELIAL CELLS: 1 /HPF
GLUCOSE QUALITATIVE U: NEGATIVE MG/DL
KETONES URINE: ABNORMAL MG/DL
LEUKOCYTE ESTERASE URINE: ABNORMAL
MICROSCOPIC-UA: NORMAL
NITRITE URINE: NEGATIVE
PH URINE: 5
PROTEIN URINE: 100 MG/DL
PSA SERPL-MCNC: 0.94 NG/ML
RED BLOOD CELLS URINE: 24 /HPF
REVIEW: NORMAL
SPECIFIC GRAVITY URINE: 1.03
UROBILINOGEN URINE: 1 MG/DL
WHITE BLOOD CELLS URINE: 571 /HPF
YEAST-LIKE CELLS: PRESENT

## 2024-04-01 PROBLEM — N40.0 BPH WITH ELEVATED PSA: Status: ACTIVE | Noted: 2023-05-24

## 2024-04-01 PROBLEM — C61 CANCER OF PROSTATE: Status: ACTIVE | Noted: 2023-10-06

## 2024-04-01 NOTE — PHYSICAL EXAM
[General Appearance - Well Nourished] : well nourished [General Appearance - Well Developed] : well developed [Well Groomed] : well groomed [Normal Appearance] : normal appearance [General Appearance - In No Acute Distress] : no acute distress [Abdomen Soft] : soft [Abdomen Tenderness] : non-tender [Costovertebral Angle Tenderness] : no ~M costovertebral angle tenderness [Edema] : no peripheral edema [] : no respiratory distress [Respiration, Rhythm And Depth] : normal respiratory rhythm and effort [Exaggerated Use Of Accessory Muscles For Inspiration] : no accessory muscle use [Oriented To Time, Place, And Person] : oriented to person, place, and time [Affect] : the affect was normal [Not Anxious] : not anxious [Mood] : the mood was normal [No Focal Deficits] : no focal deficits [Normal Station and Gait] : the gait and station were normal for the patient's age

## 2024-04-02 ENCOUNTER — APPOINTMENT (OUTPATIENT)
Dept: UROLOGY | Facility: CLINIC | Age: 69
End: 2024-04-02
Payer: COMMERCIAL

## 2024-04-02 VITALS
SYSTOLIC BLOOD PRESSURE: 159 MMHG | OXYGEN SATURATION: 95 % | TEMPERATURE: 98 F | HEIGHT: 69 IN | DIASTOLIC BLOOD PRESSURE: 102 MMHG | HEART RATE: 85 BPM

## 2024-04-02 DIAGNOSIS — C61 MALIGNANT NEOPLASM OF PROSTATE: ICD-10-CM

## 2024-04-02 DIAGNOSIS — N40.0 BENIGN PROSTATIC HYPERPLASIA WITHOUT LOWER URINARY TRACT SYMPMS: ICD-10-CM

## 2024-04-02 DIAGNOSIS — R97.20 BENIGN PROSTATIC HYPERPLASIA WITHOUT LOWER URINARY TRACT SYMPMS: ICD-10-CM

## 2024-04-02 PROCEDURE — 99214 OFFICE O/P EST MOD 30 MIN: CPT

## 2024-04-02 RX ORDER — DIAZEPAM 5 MG/1
5 TABLET ORAL
Qty: 1 | Refills: 0 | Status: DISCONTINUED | COMMUNITY
Start: 2023-06-01 | End: 2024-04-02

## 2024-04-02 RX ORDER — DIAZEPAM 5 MG/1
5 TABLET ORAL
Qty: 2 | Refills: 0 | Status: ACTIVE | COMMUNITY
Start: 2024-04-02 | End: 1900-01-01

## 2024-04-02 NOTE — LETTER BODY
[Courtesy Letter:] : I had the pleasure of seeing your patient, [unfilled], in my office today. [Dear  ___] : Dear  [unfilled], [Consult Closing:] : Thank you very much for allowing me to participate in the care of this patient.  If you have any questions, please do not hesitate to contact me. [Please see my note below.] : Please see my note below. [Sincerely,] : Sincerely, [FreeTextEntry3] : Eren Greenwood MD

## 2024-04-02 NOTE — ASSESSMENT
[FreeTextEntry1] : Assessment: Mr. BOO RHODES is a 67 year year old man with 258 cc prostate, urinary retention, elevated PSA to 5.4 with PIRADS 5 lesions on MRI, negative fusion biopsy with only 1 core GG 1 cancer on systematic biopsy. S/P HoLEP 6/30/23, passed void trial POD 1 in hospital. Pathology with 150 grams benign prostate tissue. Doing well with strong stream, complete emptying, no hematuria. Very minimal leakage, encouraged him to restart kegels.  We reviewed the natural history of prostate cancer and the low-risk nature of his disease, currently managed with active surveillance. PSA post-HoLEP was 0.94.  - F/U PSA  - Prostate MRI  - Possible prostate biopsy depending on result of MRI  - Kegels  - F/U in 6 months Opt out

## 2024-04-02 NOTE — HISTORY OF PRESENT ILLNESS
[FreeTextEntry1] : 5/24/23: 67 year old man with history of BPH, recent hospital admission for urinary retention, urosepsis and MAGGIE.  He initially presented to [facility] with urinary retention on 5/3/23. He had gilmore catheter placed at that time and was started on tamsulosin. He had subsequent unsuccessful void trial in the office. He then presented to Urologist with fevers and tachycardia, sent to ED, admitted to the hospital and started on IV antibiotics. He was found to have klebsiella bacteremia. Repeat blood cultures were negative. Eventually discharged with 14 days of levaquin.   Prior to this episode, he did have urinary symptoms, though not bothersome enough to take medications.   CT A/P: mild bilateral hydronephrosis, 200 cc prostate  PSA 6/2022--5.4. No prior prostate biopsies or prostate MRI. No known family history of prostate cancer.   Prostate biopsy 6/8/23: Negative fusion biopsy, 1/12 systematic cores with GG 1 prostate cancer  6/20/23: presents to discuss prostate biopsy result and manage catheter. Catheter draining poorly, positional. Repositioned in office with improved drainage. No hematuria or fevers.  7/7/23: Patient is s/p HoLEP on 6/30/23. He is urinating with strong, steady stream. Mild hematuria. He is drinking plenty of fluids. He has post-void dribbling. No incontinence during the day, he is using a pad with few drops of blood and wetness at end of day. He has been doing Kegel exercises.   Denies fever or chills. He completed antibiotics.   IPSS 14, QOL 3, PVR 25 cc  Pathology with 150 grams benign prostate tissue.  10/6/23: Doing well. Voiding with strong stream, complete emptying, very minimal leakage, generally occasional with coughs. Not wearing any pads. Ongoing frequency without much urgency, able to hold for longer periods of time.  IPSS 7, QOL 1, PVR 0 cc  PSA 10/2023--0.94  4/2/24: Doing well. Continues to have strong stream, very minimal leakage similar to prior, not wearing pad. Did go to PT for 8 sessions, but has not been doing exercises at home. Decreased frequency and urgency.

## 2024-04-11 LAB — PSA SERPL-MCNC: 0.31 NG/ML

## 2024-05-16 ENCOUNTER — OUTPATIENT (OUTPATIENT)
Dept: OUTPATIENT SERVICES | Facility: HOSPITAL | Age: 69
LOS: 1 days | End: 2024-05-16
Payer: COMMERCIAL

## 2024-05-16 ENCOUNTER — APPOINTMENT (OUTPATIENT)
Dept: MRI IMAGING | Facility: HOSPITAL | Age: 69
End: 2024-05-16

## 2024-05-16 DIAGNOSIS — Z90.89 ACQUIRED ABSENCE OF OTHER ORGANS: Chronic | ICD-10-CM

## 2024-05-16 PROCEDURE — A9585: CPT

## 2024-05-16 PROCEDURE — 72197 MRI PELVIS W/O & W/DYE: CPT | Mod: 26

## 2024-05-16 PROCEDURE — 72197 MRI PELVIS W/O & W/DYE: CPT

## 2024-10-16 ENCOUNTER — NON-APPOINTMENT (OUTPATIENT)
Age: 69
End: 2024-10-16

## 2025-06-13 ENCOUNTER — NON-APPOINTMENT (OUTPATIENT)
Age: 70
End: 2025-06-13

## 2025-06-13 ENCOUNTER — APPOINTMENT (OUTPATIENT)
Dept: UROLOGY | Facility: CLINIC | Age: 70
End: 2025-06-13
Payer: COMMERCIAL

## 2025-06-13 VITALS
SYSTOLIC BLOOD PRESSURE: 160 MMHG | WEIGHT: 203 LBS | HEART RATE: 80 BPM | BODY MASS INDEX: 30.07 KG/M2 | DIASTOLIC BLOOD PRESSURE: 70 MMHG | HEIGHT: 69 IN | TEMPERATURE: 98 F

## 2025-06-13 PROCEDURE — G2211 COMPLEX E/M VISIT ADD ON: CPT | Mod: NC

## 2025-06-13 PROCEDURE — 99214 OFFICE O/P EST MOD 30 MIN: CPT

## 2025-06-17 ENCOUNTER — NON-APPOINTMENT (OUTPATIENT)
Age: 70
End: 2025-06-17

## 2025-06-17 LAB
ALBUMIN SERPL ELPH-MCNC: 4.4 G/DL
ALP BLD-CCNC: 107 U/L
ALT SERPL-CCNC: 19 U/L
ANION GAP SERPL CALC-SCNC: 15 MMOL/L
APPEARANCE: CLEAR
AST SERPL-CCNC: 21 U/L
BACTERIA UR CULT: NORMAL
BACTERIA: NEGATIVE /HPF
BILIRUB SERPL-MCNC: 0.5 MG/DL
BILIRUBIN URINE: NEGATIVE
BLOOD URINE: NEGATIVE
BUN SERPL-MCNC: 12 MG/DL
CALCIUM SERPL-MCNC: 9.5 MG/DL
CAST: 2 /LPF
CHLORIDE SERPL-SCNC: 107 MMOL/L
CO2 SERPL-SCNC: 22 MMOL/L
COLOR: YELLOW
CREAT SERPL-MCNC: 1.67 MG/DL
EGFRCR SERPLBLD CKD-EPI 2021: 44 ML/MIN/1.73M2
EPITHELIAL CELLS: 3 /HPF
GLUCOSE QUALITATIVE U: NEGATIVE MG/DL
GLUCOSE SERPL-MCNC: 146 MG/DL
KETONES URINE: NEGATIVE MG/DL
LEUKOCYTE ESTERASE URINE: NEGATIVE
MICROSCOPIC-UA: NORMAL
NITRITE URINE: NEGATIVE
PH URINE: 5.5
POTASSIUM SERPL-SCNC: 3.7 MMOL/L
PROT SERPL-MCNC: 6.9 G/DL
PROTEIN URINE: NORMAL MG/DL
PSA SERPL-MCNC: 0.31 NG/ML
RED BLOOD CELLS URINE: 1 /HPF
SODIUM SERPL-SCNC: 145 MMOL/L
SPECIFIC GRAVITY URINE: 1.02
UROBILINOGEN URINE: 0.2 MG/DL
WHITE BLOOD CELLS URINE: 2 /HPF

## 2025-06-18 ENCOUNTER — NON-APPOINTMENT (OUTPATIENT)
Age: 70
End: 2025-06-18

## 2025-06-30 ENCOUNTER — APPOINTMENT (OUTPATIENT)
Dept: NEPHROLOGY | Facility: CLINIC | Age: 70
End: 2025-06-30
Payer: COMMERCIAL

## 2025-06-30 VITALS
SYSTOLIC BLOOD PRESSURE: 136 MMHG | DIASTOLIC BLOOD PRESSURE: 88 MMHG | HEIGHT: 69 IN | WEIGHT: 228 LBS | BODY MASS INDEX: 33.77 KG/M2

## 2025-06-30 PROCEDURE — 99204 OFFICE O/P NEW MOD 45 MIN: CPT

## 2025-06-30 PROCEDURE — G2211 COMPLEX E/M VISIT ADD ON: CPT | Mod: NC

## 2025-07-02 LAB
ANION GAP SERPL CALC-SCNC: 16 MMOL/L
BUN SERPL-MCNC: 19 MG/DL
CALCIUM SERPL-MCNC: 9.8 MG/DL
CALCIUM SERPL-MCNC: 9.8 MG/DL
CHLORIDE SERPL-SCNC: 104 MMOL/L
CO2 SERPL-SCNC: 22 MMOL/L
CREAT SERPL-MCNC: 1.78 MG/DL
CREAT SPEC-SCNC: 188 MG/DL
CYSTATIN C SERPL-MCNC: 1.43 MG/L
EGFRCR SERPLBLD CKD-EPI 2021: 41 ML/MIN/1.73M2
ESTIMATED AVERAGE GLUCOSE: 134 MG/DL
GFR/BSA.PRED SERPLBLD CYS-BASED-ARV: 47 ML/MIN/1.73M2
GLUCOSE SERPL-MCNC: 94 MG/DL
HBA1C MFR BLD HPLC: 6.3 %
MICROALBUMIN 24H UR DL<=1MG/L-MCNC: 2 MG/DL
MICROALBUMIN/CREAT 24H UR-RTO: 11 MG/G
PARATHYROID HORMONE INTACT: 86 PG/ML
POTASSIUM SERPL-SCNC: 4.5 MMOL/L
SODIUM SERPL-SCNC: 142 MMOL/L

## (undated) DEVICE — GRID BRACHYTHERAPY EZ 18G

## (undated) DEVICE — TUBING SET FOR SUCTION PUMP

## (undated) DEVICE — DRAPE MEDIUM SHEET 44" X 70"

## (undated) DEVICE — SOL IRR BAG NS 0.9% 3000ML

## (undated) DEVICE — PACK CYSTO

## (undated) DEVICE — UROVAC

## (undated) DEVICE — DRAINAGE BAG URINARY 4L

## (undated) DEVICE — MEDELA OVERFLOW FILTER DISPOSABLE

## (undated) DEVICE — WARMING BLANKET UPPER ADULT

## (undated) DEVICE — BALLOON ENDOCAVITY 2X14CM

## (undated) DEVICE — CONTAINER TISSUE COLLECTING DISP

## (undated) DEVICE — TAPE SILK 3"

## (undated) DEVICE — NDL HYPO REGULAR BEVEL 25G X 1.5" (BLUE)

## (undated) DEVICE — DRSG TELFA 3 X 8

## (undated) DEVICE — ELCTR BIVAP BIPOLAR VAPORIZATION 26FR

## (undated) DEVICE — VENODYNE/SCD SLEEVE CALF MEDIUM

## (undated) DEVICE — PLASTIC SOLUTION BOWL 160Z

## (undated) DEVICE — PREP CHLORAPREP HI-LITE ORANGE 26ML

## (undated) DEVICE — NDL BIOPSY CHIBA 22G X 20CM

## (undated) DEVICE — TUBING RANGER FLUID IRRIGATION SET DISP

## (undated) DEVICE — ELCTR CUTTING 24/26FR

## (undated) DEVICE — NDL MAX CORE 18G X 25CM

## (undated) DEVICE — SYR LUER LOK 50CC

## (undated) DEVICE — SYR LUER LOK 10CC

## (undated) DEVICE — GLV 7.5 PROTEXIS (WHITE)

## (undated) DEVICE — TUBING TUR 2 PRONG

## (undated) DEVICE — DRAPE TOWEL BLUE 17" X 24"

## (undated) DEVICE — FOLEY CATH 3-WAY 22FR 30CC LATEX HEMATURIA COUDE

## (undated) DEVICE — SYR CATH TIP 2 OZ